# Patient Record
Sex: FEMALE | URBAN - METROPOLITAN AREA
[De-identification: names, ages, dates, MRNs, and addresses within clinical notes are randomized per-mention and may not be internally consistent; named-entity substitution may affect disease eponyms.]

---

## 2020-02-12 ASSESSMENT — MIFFLIN-ST. JEOR: SCORE: 1456.68

## 2020-02-18 ENCOUNTER — ANESTHESIA - HEALTHEAST (OUTPATIENT)
Dept: SURGERY | Facility: AMBULATORY SURGERY CENTER | Age: 65
End: 2020-02-18

## 2020-02-19 ENCOUNTER — SURGERY - HEALTHEAST (OUTPATIENT)
Dept: SURGERY | Facility: AMBULATORY SURGERY CENTER | Age: 65
End: 2020-02-19

## 2020-02-19 ASSESSMENT — MIFFLIN-ST. JEOR: SCORE: 1443.98

## 2021-06-04 VITALS — BODY MASS INDEX: 27.58 KG/M2 | HEIGHT: 69 IN | WEIGHT: 186.2 LBS

## 2021-06-06 NOTE — ANESTHESIA CARE TRANSFER NOTE
Last vitals:   Vitals:    02/19/20 1400   BP: 131/65   Pulse: (!) 106   Resp: 16   Temp: 36.1  C (97  F)   SpO2: 95%     Patient's level of consciousness is drowsy  Spontaneous respirations: yes  Maintains airway independently: yes  Dentition unchanged: yes  Oropharynx: oropharynx clear of all foreign objects    QCDR Measures:  ASA# 20 - Surgical Safety Checklist: WHO surgical safety checklist completed prior to induction    PQRS# 430 - Adult PONV Prevention: 4558F - Pt received => 2 anti-emetic agents (different classes) preop & intraop  ASA# 8 - Peds PONV Prevention: 4558F - Pt received => 2 anti-emetic agents (different classes) preop & intraop  PQRS# 424 - Lynette-op Temp Management: 4559F - At least one body temp DOCUMENTED => 35.5C or 95.9F within required timeframe  PQRS# 426 - PACU Transfer Protocol: - Transfer of care checklist used  ASA# 14 - Acute Post-op Pain: ASA14B - Patient did NOT experience pain >= 7 out of 10

## 2021-06-06 NOTE — ANESTHESIA PREPROCEDURE EVALUATION
Anesthesia Evaluation      Patient summary reviewed   No history of anesthetic complications     Airway   Mallampati: II  Neck ROM: full   Pulmonary - negative ROS    breath sounds clear to auscultation  (-) asthma, sleep apnea, not a smoker                         Cardiovascular - negative ROS  Exercise tolerance: > or = 4 METS  Rhythm: regular  Rate: normal,         Neuro/Psych    (+) depression, anxiety/panic attacks,   (-) no seizures, no CVA    Endo/Other    (+) hypothyroidism,   (-) no diabetes, no obesity     GI/Hepatic/Renal - negative ROS   (-) GERD          Dental    (+) caps    Comment: Fair dentition  No loose or removable teeth                       Anesthesia Plan  Planned anesthetic: general endotracheal and total IV anesthesia  GETA, 7.0 ETT  TIVA, propofol gtt  Ketamine 30 mg on induction, ketorolac 15 mg if ok w/ surgeon  Scopolamine, dexamethasone 10 mg, ondansetron 4 mg  ASA 2   Induction: intravenous   Anesthetic plan and risks discussed with: patient  Anesthesia plan special considerations: antiemetics,   Post-op plan: routine recovery

## 2021-06-06 NOTE — ANESTHESIA POSTPROCEDURE EVALUATION
Patient: Elke Hackett  Procedure(s):  BILATERAL BREAST LIFT/BILATERAL LATERAL BREAST REDUCTION / FULL BACK LIPOSUCTION (Bilateral)  MAMMOPLASTY, REDUCTION (NON COVERED) (Bilateral)  LIPOSUCTION (NON COVERED)  Anesthesia type: general    Patient location: PACU  Last vitals:   Vitals Value Taken Time   /79 2/19/2020  2:25 PM   Temp  2/19/2020  3:26 PM   Pulse 88 2/19/2020  2:25 PM   Resp 18 2/19/2020  2:25 PM   SpO2 91 % 2/19/2020  2:24 PM   Vitals shown include unvalidated device data.  Post vital signs: stable  Level of consciousness: awake and responds to simple questions  Post-anesthesia pain: pain controlled  Post-anesthesia nausea and vomiting: no  Pulmonary: unassisted, return to baseline  Cardiovascular: stable and blood pressure at baseline  Hydration: adequate  Anesthetic events: no    QCDR Measures:  ASA# 11 - [facility]ette-op Cardiac Arrest: ASA11B - Patient did NOT experience unanticipated cardiac arrest  ASA# 12 - [facility]ette-op Mortality Rate: ASA12B - Patient did NOT die  ASA# 13 - PACU Re-Intubation Rate: ASA13B - Patient did NOT require a new airway mgmt  ASA# 10 - Composite Anes Safety: ASA10A - No serious adverse event    Additional Notes:

## 2021-06-26 ENCOUNTER — HEALTH MAINTENANCE LETTER (OUTPATIENT)
Age: 66
End: 2021-06-26

## 2021-10-16 ENCOUNTER — HEALTH MAINTENANCE LETTER (OUTPATIENT)
Age: 66
End: 2021-10-16

## 2022-07-23 ENCOUNTER — HEALTH MAINTENANCE LETTER (OUTPATIENT)
Age: 67
End: 2022-07-23

## 2022-10-01 ENCOUNTER — HEALTH MAINTENANCE LETTER (OUTPATIENT)
Age: 67
End: 2022-10-01

## 2023-08-06 ENCOUNTER — HEALTH MAINTENANCE LETTER (OUTPATIENT)
Age: 68
End: 2023-08-06